# Patient Record
Sex: MALE | Race: WHITE | URBAN - METROPOLITAN AREA
[De-identification: names, ages, dates, MRNs, and addresses within clinical notes are randomized per-mention and may not be internally consistent; named-entity substitution may affect disease eponyms.]

---

## 2020-07-08 ENCOUNTER — OFFICE VISIT (OUTPATIENT)
Dept: URBAN - METROPOLITAN AREA TELEHEALTH 2 | Facility: TELEHEALTH | Age: 20
End: 2020-07-08
Payer: COMMERCIAL

## 2020-07-08 DIAGNOSIS — R10.84 ABDOMINAL PAIN, GENERALIZED: ICD-10-CM

## 2020-07-08 DIAGNOSIS — K50.80 CROHN'S DISEASE OF BOTH SMALL AND LARGE INTESTINE WITHOUT COMPLICATIONS: ICD-10-CM

## 2020-07-08 DIAGNOSIS — R74.8 ELEVATED LIVER ENZYMES: ICD-10-CM

## 2020-07-08 PROCEDURE — 99215 OFFICE O/P EST HI 40 MIN: CPT | Performed by: INTERNAL MEDICINE

## 2020-07-08 PROCEDURE — 82607 VITAMIN B-12: CPT | Performed by: INTERNAL MEDICINE

## 2020-07-08 PROCEDURE — 80299 QUANTITATIVE ASSAY DRUG: CPT | Performed by: INTERNAL MEDICINE

## 2020-07-08 PROCEDURE — 82397 CHEMILUMINESCENT ASSAY: CPT | Performed by: INTERNAL MEDICINE

## 2020-07-08 PROCEDURE — 82746 ASSAY OF FOLIC ACID SERUM: CPT | Performed by: INTERNAL MEDICINE

## 2020-07-08 RX ORDER — USTEKINUMAB 90 MG/ML
INJECT 1 MILLILITER (90 MG) BY SUBCUTANEOUS ROUTE EVERY 8 WEEKS FOR 56 DAYS INJECTION, SOLUTION SUBCUTANEOUS
Qty: 1 | Refills: 6 | Status: ACTIVE | COMMUNITY
Start: 2020-03-23 | End: 2021-04-19

## 2020-07-08 NOTE — HPI-OTHER HISTORIES
21 yo male with Crohn's disease dx this year.  Feeling the best he has in the last 6 months.  CD was ongoing in 2019 and was not diagnosed.  Primarily  the abd pain.  Had some diarrhea, only food triggered.  Had diarrhea up to 5x a day. No bleeding. Abd pain was moderate.  Abd pain is gone. Diarrhea is resolved. Stooling nl.  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  Meds: Stelara 90 mg q 8 weeks Vit D 10,000 u  Fluoxetine 20 mg a day ---- it has helped the anxiety.  He has more energy.  Follow up in 2 months.  everything is good.   Had a borderline b12, elevated liver test, and high calpro and lact values. will want to f/u on these and have him do a f2f in 3 months and a colonoscopy in early 20  This is a scheduled appointment for this patient, a 20 year old male, after a previous visit approximately Crohn's disease.     21 yo male seen 3 weeks ago presents for telehealth audio visit as video not connected.  Syx are the same.  Abdominal pain is the same as a few weeks ago. Pain is 1 hour or less. Pain is 10 minutes or less.  No constipation or diarrhea.  No bleeding.  Tapering prednisone and is down to 10 mg or 1 pill a day.  On flagyl 500 bid.  He does not know why he does not feel well.  He does not feel the Crohn's is a problem right now.  Physically with the Crohn's is ok.  Mentally, not good. Been on 1 pill a day and had an 8 week course. Got the Stelara 5 days ago.  Stop the prednisone. Taking Vit D. B12 of 299. labs generally good ----- lft 111. No alcohol  His mind is racing. He can't get his mind to settle. This was an issue from before.    ===== 3/23/20 21 yo male referred by Emil Bran MD for Crohn's disease care and for evaluation and treatment.  Syx started summer 2019 and they have been intermittent.  He thought syx were muscular.  Had pain for 2 weeks but severe for 2 days before presenting to the ER on 19.  Dr. Abreu-surgeon - saw him in the ER, got a CT scan, and did a lap appendectomy.  Went home the same.    Was on cipro for 10 days and might have been better.  Pain was better over the next few weeks.  Went back to the ER because of abdominal pain and was reassured.  One week later, went back to the ER due to abdominal pain,did a CT scan, and was told there was inflammation and scar tissue.  Pain was moderate, mostly with activity.  Over the next 2 months, syx were intermittent, saw family medicine, saw the surgeon.  , Dr. Agustin, [facility] surgery, ordered another CT due to pain, and was admitted  and had stump appendectomy and graunulomas were seen on that bx.  After second surgery was on cipro and flagyl and just finished them both and tolerated them fine and has been feeling better than in past.  10 days of both after second surgery.  Colonoscopy on  and this showed ileocecal ulceration and bx c/w Crohn's disease. Granulomas not seen.  Granulation tissue and crypt abscesses.  Was on 40 mg prednisone a day and this is second week of 30 mg a day.  8 week course/taper.  Currently on prednisone 30 mg a day.  Has some low grade intermittent abdominal pain.  Ate junk food.  Syx  2 stools a day No pain for days. Energy       Review of Systems  ConstitutionalDenies : body aches, fever, weight gain, weight loss  CardiovascularDenies : chest pain, heart racing/skipping, high blood pressure  RespiratoryDenies : chronic cough, shortness of breath, wheezing or asthma symptoms  GastrointestinalDenies : Abdominal Pain/discomfort, Anal/Rectal Pain or Itching, Anal Spasm, Black Stool, Bloating/belching/gaseouness, Change of Bowel Habit, Constipation, Diarrhea/Loose Stool, Difficulty in Swallowing, Heartburn/esophageal reflux, Hemorrhoids, Indigestion, Mucus in Stool, Nausea/vomiting, Rectal Bleeding, Unintentional Weight Loss    Vitals  Date Time BP Position Site L\R Cuff Size HR RR TEMP (F) WT  HT  BMI kg/m2 BSA m2 O2 Sat HC     2020 09:51 AM         161lbs 0oz 6'   21.84 1.93          Assessment  Crohn's Disease     555.2  Elevated LFTs     790.6/R94.5   Problems Reconciled  Plan  OrdersPatient not identified as an unhealthy alcohol user when screene () - - 2020  Influenza immunization administered or previously received () - - 2020  BMI screening above normal () - - 2020  Current tobacco non-user (CAD, cap, COPD, PV) (dm) (IBD) (1036F, ) - - 2020  Colorectal cancer screening results documented and reviewed (PV) (3017F) - - 2020  Documentation of current medications. () - - 2020  MedicationsMedications have been Reconciled  Transition of Care or Provider Policy  InstructionsPatient seen today via telehealth by agreement and consent of patient in light of current COVID-19 pandemic. I used video conferencing during the visit. The patient encounter is appropriate and reasonable under the circumstances given the patient's particular presentation at this time. The patient has been advised of the followin) the potential risks and limitations of this mode of treatment (including but not limited to the absence of in-person examination); 2) the right to refuse telehealth services at any point without affecting the right to future care; 3) the right to receive in-person services, included immediately after this consultation if an urgent need arises; 4) information, including identifiable images or information from this telehealth consult, will only be shared in accordance with HIPPA regulations. Any and all of the patient's and/or patient's family member's questions on this issue have been answered. The patient has verbally consented to be treated via telehealth services. The patient has also been advised to contact this office for worsening conditions or problems, and seek emergency medical treatment and/or call 911 if the patient deems either necessary.  Telephone E/M 16-20 minutes ([id number] NP/EP)  I have documented a list of current medications and reviewed it with the patient.  I encouraged the patient to diet and exercise.  DispositionReturn To Clinic in 1 Month    75340 visit. 16 minutes.  telehealth in a month  d/c flagyl d/c prednisone         Electronically Signed by: Norris Panchal MD -Author on 2020 02:53:19 PM

## 2020-08-14 LAB
A/G RATIO: 2.2
ALBUMIN: 4.7
ALKALINE PHOSPHATASE: 104
ALT (SGPT): 15
ANTI-USTEKINUMAB ANTIBODY: <40
AST (SGOT): 14
BASO (ABSOLUTE): 0
BASOS: 0
BILIRUBIN, TOTAL: 0.4
BUN/CREATININE RATIO: 10
BUN: 11
C-REACTIVE PROTEIN, QUANT: 1
CALCIUM: 9.8
CALPROTECTIN, FECAL: 201
CARBON DIOXIDE, TOTAL: 23
CHLORIDE: 102
CREATININE: 1.09
EGFR IF AFRICN AM: 112
EGFR IF NONAFRICN AM: 97
EOS (ABSOLUTE): 0
EOS: 0
FERRITIN, SERUM: 20
FOLATE (FOLIC ACID), SERUM: 11.9
GLOBULIN, TOTAL: 2.1
GLUCOSE: 93
HEMATOCRIT: 39.7
HEMATOLOGY COMMENTS:: (no result)
HEMOGLOBIN: 13.5
IMMATURE CELLS: (no result)
IMMATURE GRANS (ABS): 0
IMMATURE GRANULOCYTES: 0
IRON BIND.CAP.(TIBC): 385
IRON SATURATION: 29
IRON: 110
LACTOFERRIN, FECAL, QUANT.: 6.21
LYMPHS (ABSOLUTE): 1.8
LYMPHS: 25
MCH: 28.7
MCHC: 34
MCV: 85
MONOCYTES(ABSOLUTE): 0.6
MONOCYTES: 8
NEUTROPHILS (ABSOLUTE): 4.8
NEUTROPHILS: 67
NRBC: (no result)
PLATELETS: 314
POTASSIUM: 4.4
PROTEIN, TOTAL: 6.8
RBC: 4.7
RDW: 11.7
SEDIMENTATION RATE-WESTERGREN: 2
SODIUM: 141
UIBC: 275
USTEKINUMAB: 3.8
VITAMIN B12: 269
WBC: 7.2

## 2021-03-04 ENCOUNTER — TELEPHONE ENCOUNTER (OUTPATIENT)
Dept: URBAN - METROPOLITAN AREA CLINIC 98 | Facility: CLINIC | Age: 21
End: 2021-03-04

## 2021-03-04 RX ORDER — USTEKINUMAB 90 MG/ML
INJECT 90 MG INJECTION, SOLUTION SUBCUTANEOUS
Qty: 1 SYRINGE | Refills: 6 | OUTPATIENT
Start: 2021-03-04 | End: 2022-04-28

## 2021-03-06 ENCOUNTER — ERX REFILL RESPONSE (OUTPATIENT)
Dept: URBAN - METROPOLITAN AREA CLINIC 98 | Facility: CLINIC | Age: 21
End: 2021-03-06

## 2021-03-06 RX ORDER — USTEKINUMAB 90 MG/ML
INJECT 1ML (90MG) UNDER THE SKIN EVERY 8 WEEKS INJECTION, SOLUTION SUBCUTANEOUS
Qty: 1 | Refills: 5

## 2021-04-27 ENCOUNTER — TELEPHONE ENCOUNTER (OUTPATIENT)
Dept: URBAN - METROPOLITAN AREA CLINIC 98 | Facility: CLINIC | Age: 21
End: 2021-04-27

## 2021-04-27 RX ORDER — USTEKINUMAB 90 MG/ML
INJECT 90 MG INJECTION, SOLUTION SUBCUTANEOUS
Qty: 1 SYRINGE | Refills: 6 | OUTPATIENT
Start: 2021-04-27 | End: 2022-06-21

## 2021-05-04 ENCOUNTER — TELEPHONE ENCOUNTER (OUTPATIENT)
Dept: URBAN - METROPOLITAN AREA CLINIC 98 | Facility: CLINIC | Age: 21
End: 2021-05-04

## 2021-05-04 RX ORDER — USTEKINUMAB 90 MG/ML
INJECT 90 MG INJECTION, SOLUTION SUBCUTANEOUS
Qty: 1 SYRINGE | Refills: 6
Start: 2021-04-27

## 2021-05-04 RX ORDER — USTEKINUMAB 90 MG/ML
INJECT 90 MG INJECTION, SOLUTION SUBCUTANEOUS
Qty: 1 SYRINGE | Refills: 6 | OUTPATIENT
Start: 2021-05-04 | End: 2022-06-28

## 2021-05-07 ENCOUNTER — TELEPHONE ENCOUNTER (OUTPATIENT)
Dept: URBAN - METROPOLITAN AREA CLINIC 98 | Facility: CLINIC | Age: 21
End: 2021-05-07

## 2021-05-07 RX ORDER — USTEKINUMAB 90 MG/ML
INJECT 90 MG INJECTION, SOLUTION SUBCUTANEOUS
Qty: 1 SYRINGE | Refills: 6 | OUTPATIENT
Start: 2021-05-07 | End: 2022-07-01

## 2022-04-09 ENCOUNTER — TELEPHONE ENCOUNTER (OUTPATIENT)
Dept: URBAN - METROPOLITAN AREA CLINIC 92 | Facility: CLINIC | Age: 22
End: 2022-04-09

## 2022-04-23 ENCOUNTER — ERX REFILL RESPONSE (OUTPATIENT)
Dept: URBAN - METROPOLITAN AREA CLINIC 98 | Facility: CLINIC | Age: 22
End: 2022-04-23

## 2022-04-23 RX ORDER — USTEKINUMAB 90 MG/ML
INJECT 1ML (90MG) UNDER THE SKIN EVERY 8 WEEKS INJECTION, SOLUTION SUBCUTANEOUS
Qty: 1 | Refills: 5 | OUTPATIENT

## 2022-04-23 RX ORDER — USTEKINUMAB 90 MG/ML
INJECT 90 MG INJECTION, SOLUTION SUBCUTANEOUS
Qty: 1 SYRINGE | Refills: 6 | OUTPATIENT

## 2022-05-15 LAB
A/G RATIO: 1.9
ALBUMIN: 4.7
ALKALINE PHOSPHATASE: 104
ALT (SGPT): 18
AST (SGOT): 14
BASO (ABSOLUTE): 0.1
BASOS: 1
BILIRUBIN, TOTAL: 0.2
BUN/CREATININE RATIO: 7
BUN: 7
C-REACTIVE PROTEIN, QUANT: 107
CALCIUM: 9.8
CARBON DIOXIDE, TOTAL: 21
CHLORIDE: 101
CREATININE: 0.97
EGFR: 113
EOS (ABSOLUTE): 0.2
EOS: 2
FERRITIN, SERUM: 226
FOLATE (FOLIC ACID), SERUM: 5.7
GLOBULIN, TOTAL: 2.5
GLUCOSE: 87
HBSAG SCREEN: NEGATIVE
HEMATOCRIT: 42.3
HEMATOLOGY COMMENTS:: (no result)
HEMOGLOBIN: 14.2
HEP B SURFACE AB, QUAL: NON REACTIVE
IMMATURE CELLS: (no result)
IMMATURE GRANS (ABS): 0
IMMATURE GRANULOCYTES: 0
IRON BIND.CAP.(TIBC): 331
IRON SATURATION: 5
IRON: 16
LYMPHS (ABSOLUTE): 2
LYMPHS: 15
MCH: 30.5
MCHC: 33.6
MCV: 91
MONOCYTES(ABSOLUTE): 0.6
MONOCYTES: 4
NEUTROPHILS (ABSOLUTE): 10
NEUTROPHILS: 78
NRBC: (no result)
PLATELETS: 265
POTASSIUM: 3.8
PROTEIN, TOTAL: 7.2
QUANTIFERON CRITERIA: (no result)
QUANTIFERON INCUBATION: (no result)
QUANTIFERON MITOGEN VALUE: >10
QUANTIFERON NIL VALUE: 0.01
QUANTIFERON TB1 AG VALUE: 0.01
QUANTIFERON TB2 AG VALUE: 0.01
QUANTIFERON-TB GOLD PLUS: NEGATIVE
RBC: 4.66
RDW: 11.8
SEDIMENTATION RATE-WESTERGREN: 24
SODIUM: 141
UIBC: 315
VITAMIN B12: 307
VITAMIN D, 25-HYDROXY: 22.2
WBC: 12.8

## 2022-06-03 ENCOUNTER — OFFICE VISIT (OUTPATIENT)
Dept: URBAN - METROPOLITAN AREA TELEHEALTH 2 | Facility: TELEHEALTH | Age: 22
End: 2022-06-03
Payer: COMMERCIAL

## 2022-06-03 DIAGNOSIS — R94.5 ELEVATED LFTS: ICD-10-CM

## 2022-06-03 DIAGNOSIS — K50.80 CROHN'S COLITIS: ICD-10-CM

## 2022-06-03 DIAGNOSIS — R10.84 ABDOMINAL PAIN, GENERALIZED: ICD-10-CM

## 2022-06-03 PROCEDURE — 99213 OFFICE O/P EST LOW 20 MIN: CPT | Performed by: INTERNAL MEDICINE

## 2022-06-03 RX ORDER — USTEKINUMAB 90 MG/ML
INJECT 90 MG INJECTION, SOLUTION SUBCUTANEOUS
Qty: 1 SYRINGE | Refills: 6 | Status: ACTIVE | COMMUNITY

## 2022-06-03 NOTE — HPI-TODAY'S VISIT:
21 yo male for telehealth visit. 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  Crohn's disease has been stable. No recurrence of syx. I started him on Stelara. Hadnot been on other biologics.  Currently no pain diarrhea or bleeding.  He went and had labs a couple weeks ago and 22 labs showed  ESR 24/15 CRP-107!!!~ nl cbc fe5% Vit D -100   Day of labs was in [place] and had bad allergies and that might have causedd the CRP to be so high and so will repeat.   ========================== 2020  19 yo male with Crohn's disease dx this year.  Feeling the best he has in the last 6 months.  CD was ongoing in 2019 and was not diagnosed.  Primarily  the abd pain.  Had some diarrhea, only food triggered.  Had diarrhea up to 5x a day. No bleeding. Abd pain was moderate.  Abd pain is gone. Diarrhea is resolved. Stooling nl.  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  Meds: Stelara 90 mg q 8 weeks Vit D 10,000 u  Fluoxetine 20 mg a day ---- it has helped the anxiety.  He has more energy.  Follow up in 2 months.  everything is good.   Had a borderline b12, elevated liver test, and high calpro and lact values. will want to f/u on these and have him do a f2f in 3 months and a colonoscopy in early 20  This is a scheduled appointment for this patient, a 20 year old male, after a previous visit approximately Crohn's disease.     19 yo male seen 3 weeks ago presents for telehealth audio visit as video not connected.  Syx are the same.  Abdominal pain is the same as a few weeks ago. Pain is 1 hour or less. Pain is 10 minutes or less.  No constipation or diarrhea.  No bleeding.  Tapering prednisone and is down to 10 mg or 1 pill a day.  On flagyl 500 bid.  He does not know why he does not feel well.  He does not feel the Crohn's is a problem right now.  Physically with the Crohn's is ok.  Mentally, not good. Been on 1 pill a day and had an 8 week course. Got the Stelara 5 days ago.  Stop the prednisone. Taking Vit D. B12 of 299. labs generally good ----- lft 111. No alcohol  His mind is racing. He can't get his mind to settle. This was an issue from before.    ===== 3/23/20 19 yo male referred by Emil Bran MD for Crohn's disease care and for evaluation and treatment.  Syx started summer 2019 and they have been intermittent.  He thought syx were muscular.  Had pain for 2 weeks but severe for 2 days before presenting to the ER on 19.  Dr. Abreu-surgeon - saw him in the ER, got a CT scan, and did a lap appendectomy.  Went home the same.    Was on cipro for 10 days and might have been better.  Pain was better over the next few weeks.  Went back to the ER because of abdominal pain and was reassured.  One week later, went back to the ER due to abdominal pain,did a CT scan, and was told there was inflammation and scar tissue.  Pain was moderate, mostly with activity.  Over the next 2 months, syx were intermittent, saw family medicine, saw the surgeon.  , Dr. Agustin, [facility] surgery, ordered another CT due to pain, and was admitted  and had stump appendectomy and graunulomas were seen on that bx.  After second surgery was on cipro and flagyl and just finished them both and tolerated them fine and has been feeling better than in past.  10 days of both after second surgery.  Colonoscopy on  and this showed ileocecal ulceration and bx c/w Crohn's disease. Granulomas not seen.  Granulation tissue and crypt abscesses.  Was on 40 mg prednisone a day and this is second week of 30 mg a day.  8 week course/taper.  Currently on prednisone 30 mg a day.  Has some low grade intermittent abdominal pain.  Ate junk food.  Syx  2 stools a day No pain for days. Energy       Review of Systems  ConstitutionalDenies : body aches, fever, weight gain, weight loss  CardiovascularDenies : chest pain, heart racing/skipping, high blood pressure  RespiratoryDenies : chronic cough, shortness of breath, wheezing or asthma symptoms  GastrointestinalDenies : Abdominal Pain/discomfort, Anal/Rectal Pain or Itching, Anal Spasm, Black Stool, Bloating/belching/gaseouness, Change of Bowel Habit, Constipation, Diarrhea/Loose Stool, Difficulty in Swallowing, Heartburn/esophageal reflux, Hemorrhoids, Indigestion, Mucus in Stool, Nausea/vomiting, Rectal Bleeding, Unintentional Weight Loss    Vitals  Date Time BP Position Site L\R Cuff Size HR RR TEMP (F) WT  HT  BMI kg/m2 BSA m2 O2 Sat HC     2020 09:51 AM         161lbs 0oz 6'   21.84 1.93          Assessment  Crohn's Disease     555.2  Elevated LFTs     790.6/R94.5   Problems Reconciled  Plan  OrdersPatient not identified as an unhealthy alcohol user when screene () - - 2020  Influenza immunization administered or previously received () - - 2020  BMI screening above normal () - - 2020  Current tobacco non-user (CAD, cap, COPD, PV) (dm) (IBD) (1036F, ) - - 2020  Colorectal cancer screening results documented and reviewed (PV) (3017F) - - 2020  Documentation of current medications. () - - 2020  MedicationsMedications have been Reconciled  Transition of Care or Provider Policy  InstructionsPatient seen today via telehealth by agreement and consent of patient in light of current COVID-19 pandemic. I used video conferencing during the visit. The patient encounter is appropriate and reasonable under the circumstances given the patient's particular presentation at this time. The patient has been advised of the followin) the potential risks and limitations of this mode of treatment (including but not limited to the absence of in-person examination); 2) the right to refuse telehealth services at any point without affecting the right to future care; 3) the right to receive in-person services, included immediately after this consultation if an urgent need arises; 4) information, including identifiable images or information from this telehealth consult, will only be shared in accordance with HIPPA regulations. Any and all of the patient's and/or patient's family member's questions on this issue have been answered. The patient has verbally consented to be treated via telehealth services. The patient has also been advised to contact this office for worsening conditions or problems, and seek emergency medical treatment and/or call 911 if the patient deems either necessary.  Telephone E/M 16-20 minutes ([id number] NP/EP)  I have documented a list of current medications and reviewed it with the patient.  I encouraged the patient to diet and exercise.  DispositionReturn To Clinic in 1 Month    47609 visit. 16 minutes.  telehealth in a month  d/c flagyl d/c prednisone         Electronically Signed by: Norris Panchal MD -Author on 2020 02:53:19 PM

## 2022-06-14 ENCOUNTER — OFFICE VISIT (OUTPATIENT)
Dept: URBAN - METROPOLITAN AREA CLINIC 98 | Facility: CLINIC | Age: 22
End: 2022-06-14

## 2022-07-05 ENCOUNTER — OFFICE VISIT (OUTPATIENT)
Dept: URBAN - METROPOLITAN AREA CLINIC 98 | Facility: CLINIC | Age: 22
End: 2022-07-05
Payer: COMMERCIAL

## 2022-07-05 ENCOUNTER — TELEPHONE ENCOUNTER (OUTPATIENT)
Dept: URBAN - METROPOLITAN AREA CLINIC 98 | Facility: CLINIC | Age: 22
End: 2022-07-05

## 2022-07-05 ENCOUNTER — WEB ENCOUNTER (OUTPATIENT)
Dept: URBAN - METROPOLITAN AREA CLINIC 98 | Facility: CLINIC | Age: 22
End: 2022-07-05

## 2022-07-05 VITALS
HEART RATE: 82 BPM | DIASTOLIC BLOOD PRESSURE: 73 MMHG | HEIGHT: 72 IN | WEIGHT: 170 LBS | BODY MASS INDEX: 23.03 KG/M2 | SYSTOLIC BLOOD PRESSURE: 107 MMHG | TEMPERATURE: 97.9 F

## 2022-07-05 DIAGNOSIS — R10.84 ABDOMINAL PAIN, GENERALIZED: ICD-10-CM

## 2022-07-05 DIAGNOSIS — R94.5 ELEVATED LFTS: ICD-10-CM

## 2022-07-05 DIAGNOSIS — K50.90 CROHN'S DISEASE WITHOUT COMPLICATION, UNSPECIFIED GASTROINTESTINAL TRACT LOCATION: ICD-10-CM

## 2022-07-05 PROCEDURE — 99215 OFFICE O/P EST HI 40 MIN: CPT | Performed by: INTERNAL MEDICINE

## 2022-07-05 RX ORDER — USTEKINUMAB 90 MG/ML
INJECT 90 MG INJECTION, SOLUTION SUBCUTANEOUS
Qty: 1 SYRINGE | Refills: 6 | Status: ACTIVE | COMMUNITY

## 2022-07-05 RX ORDER — USTEKINUMAB 90 MG/ML
INJECT 90 MG INJECTION, SOLUTION SUBCUTANEOUS
Qty: 1 SYRINGE | Refills: 6
End: 2023-08-29

## 2022-07-05 NOTE — HPI-TODAY'S VISIT:
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 Back home for check up.  Feeling fine. Only time he has a problem is just before the Stelara Proactive monitoring rather than reactive monitroing. Awaiting Stelara and other labs.  Dx with CD 2020, dr. Bran. Rx only with Stelara started 2020. Before Stelara, had appy x2. 22------ Let him know i reviewed his stool tests which were nl, and so, if he had been taking stelara q 10, just move up to q 8 and that will be fine ============================= 6/3/22 and prior TH on 20  21 yo male for telehealth visit. 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  Crohn's disease has been stable. No recurrence of syx. I started him on Stelara. Hadnot been on other biologics.  Currently no pain diarrhea or bleeding.  He went and had labs a couple weeks ago and 22 labs showed  ESR 24/15 CRP-107!!!~ nl cbc fe5% Vit D -100   Day of labs was in [place] and had bad allergies and that might have causedd the CRP to be so high and so will repeat.   ========================== 2020  21 yo male with Crohn's disease dx this year.  Feeling the best he has in the last 6 months.  CD was ongoing in 2019 and was not diagnosed.  Primarily  the abd pain.  Had some diarrhea, only food triggered.  Had diarrhea up to 5x a day. No bleeding. Abd pain was moderate.  Abd pain is gone. Diarrhea is resolved. Stooling nl.  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  Meds: Stelara 90 mg q 8 weeks Vit D 10,000 u  Fluoxetine 20 mg a day ---- it has helped the anxiety.  He has more energy.  Follow up in 2 months.  everything is good.   Had a borderline b12, elevated liver test, and high calpro and lact values. will want to f/u on these and have him do a f2f in 3 months and a colonoscopy in early 20  This is a scheduled appointment for this patient, a 20 year old male, after a previous visit approximately Crohn's disease.     21 yo male seen 3 weeks ago presents for telehealth audio visit as video not connected.  Syx are the same.  Abdominal pain is the same as a few weeks ago. Pain is 1 hour or less. Pain is 10 minutes or less.  No constipation or diarrhea.  No bleeding.  Tapering prednisone and is down to 10 mg or 1 pill a day.  On flagyl 500 bid.  He does not know why he does not feel well.  He does not feel the Crohn's is a problem right now.  Physically with the Crohn's is ok.  Mentally, not good. Been on 1 pill a day and had an 8 week course. Got the Stelara 5 days ago.  Stop the prednisone. Taking Vit D. B12 of 299. labs generally good ----- lft 111. No alcohol  His mind is racing. He can't get his mind to settle. This was an issue from before.    ===== 3/23/20 21 yo male referred by Emil Bran MD for Crohn's disease care and for evaluation and treatment.  Syx started summer 2019 and they have been intermittent.  He thought syx were muscular.  Had pain for 2 weeks but severe for 2 days before presenting to the ER on 19.  Dr. Abreu-surgeon - saw him in the ER, got a CT scan, and did a lap appendectomy.  Went home the same.    Was on cipro for 10 days and might have been better.  Pain was better over the next few weeks.  Went back to the ER because of abdominal pain and was reassured.  One week later, went back to the ER due to abdominal pain,did a CT scan, and was told there was inflammation and scar tissue.  Pain was moderate, mostly with activity.  Over the next 2 months, syx were intermittent, saw family medicine, saw the surgeon.  , Dr. Agustin, [facility] surgery, ordered another CT due to pain, and was admitted  and had stump appendectomy and graunulomas were seen on that bx.  After second surgery was on cipro and flagyl and just finished them both and tolerated them fine and has been feeling better than in past.  10 days of both after second surgery.  Colonoscopy on  and this showed ileocecal ulceration and bx c/w Crohn's disease. Granulomas not seen.  Granulation tissue and crypt abscesses.  Was on 40 mg prednisone a day and this is second week of 30 mg a day.  8 week course/taper.  Currently on prednisone 30 mg a day.  Has some low grade intermittent abdominal pain.  Ate junk food.  Syx  2 stools a day No pain for days. Energy       Review of Systems  ConstitutionalDenies : body aches, fever, weight gain, weight loss  CardiovascularDenies : chest pain, heart racing/skipping, high blood pressure  RespiratoryDenies : chronic cough, shortness of breath, wheezing or asthma symptoms  GastrointestinalDenies : Abdominal Pain/discomfort, Anal/Rectal Pain or Itching, Anal Spasm, Black Stool, Bloating/belching/gaseouness, Change of Bowel Habit, Constipation, Diarrhea/Loose Stool, Difficulty in Swallowing, Heartburn/esophageal reflux, Hemorrhoids, Indigestion, Mucus in Stool, Nausea/vomiting, Rectal Bleeding, Unintentional Weight Loss    Vitals  Date Time BP Position Site L\R Cuff Size HR RR TEMP (F) WT  HT  BMI kg/m2 BSA m2 O2 Sat HC     2020 09:51 AM         161lbs 0oz 6'   21.84 1.93          Assessment  Crohn's Disease     555.2  Elevated LFTs     790.6/R94.5   Problems Reconciled  Plan  OrdersPatient not identified as an unhealthy alcohol user when screene () - - 2020  Influenza immunization administered or previously received () - - 2020  BMI screening above normal () - - 2020  Current tobacco non-user (CAD, cap, COPD, PV) (dm) (IBD) (1036F, ) - - 2020  Colorectal cancer screening results documented and reviewed (PV) (3017F) - - 2020  Documentation of current medications. () - - 2020  MedicationsMedications have been Reconciled  Transition of Care or Provider Policy  InstructionsPatient seen today via telehealth by agreement and consent of patient in light of current COVID-19 pandemic. I used video conferencing during the visit. The patient encounter is appropriate and reasonable under the circumstances given the patient's particular presentation at this time. The patient has been advised of the followin) the potential risks and limitations of this mode of treatment (including but not limited to the absence of in-person examination); 2) the right to refuse telehealth services at any point without affecting the right to future care; 3) the right to receive in-person services, included immediately after this consultation if an urgent need arises; 4) information, including identifiable images or information from this telehealth consult, will only be shared in accordance with HIPPA regulations. Any and all of the patient's and/or patient's family member's questions on this issue have been answered. The patient has verbally consented to be treated via telehealth services. The patient has also been advised to contact this office for worsening conditions or problems, and seek emergency medical treatment and/or call 911 if the patient deems either necessary.  Telephone E/M 16-20 minutes ([id number] NP/EP)  I have documented a list of current medications and reviewed it with the patient.  I encouraged the patient to diet and exercise.  DispositionReturn To Clinic in 1 Month    30634 visit. 16 minutes.  telehealth in a month  d/c flagyl d/c prednisone       ----------22  et him know i reviewed his stool tests which were nl, and so, if he had been taking stelara q 10, just move up to q 8 and that will be fine  --------------------------- Electronically Signed by: Norris Panchal MD -Author on 2020 02:53:19 PM

## 2022-07-11 LAB
A/G RATIO: 1.9
ALBUMIN: 4.7
ALKALINE PHOSPHATASE: 79
ALT (SGPT): 22
ANTI-USTEKINUMAB ANTIBODY: <40
AST (SGOT): 16
BASO (ABSOLUTE): 0
BASOS: 0
BILIRUBIN, TOTAL: 0.3
BUN/CREATININE RATIO: 9
BUN: 10
C-REACTIVE PROTEIN, QUANT: <1
CALCIUM: 9.4
CARBON DIOXIDE, TOTAL: 22
CHLORIDE: 102
CREATININE: 1.1
EGFR: 97
EOS (ABSOLUTE): 0.1
EOS: 1
GLOBULIN, TOTAL: 2.5
GLUCOSE: 92
HEMATOCRIT: 42.6
HEMATOLOGY COMMENTS:: (no result)
HEMOGLOBIN: 14
IMMATURE CELLS: (no result)
IMMATURE GRANS (ABS): 0
IMMATURE GRANULOCYTES: 0
LYMPHS (ABSOLUTE): 3.3
LYMPHS: 35
MCH: 30.2
MCHC: 32.9
MCV: 92
MONOCYTES(ABSOLUTE): 0.5
MONOCYTES: 5
NEUTROPHILS (ABSOLUTE): 5.4
NEUTROPHILS: 59
NRBC: (no result)
PLATELETS: 294
POTASSIUM: 4.4
PROTEIN, TOTAL: 7.2
RBC: 4.63
RDW: 12.3
SEDIMENTATION RATE-WESTERGREN: 2
SODIUM: 140
USTEKINUMAB: 1.3
WBC: 9.4

## 2022-07-12 ENCOUNTER — TELEPHONE ENCOUNTER (OUTPATIENT)
Dept: URBAN - METROPOLITAN AREA CLINIC 98 | Facility: CLINIC | Age: 22
End: 2022-07-12

## 2022-08-03 LAB
CALPROTECTIN, FECAL: <16
LACTOFERRIN, FECAL, QUANT.: 1.55

## 2022-11-17 ENCOUNTER — CLAIMS CREATED FROM THE CLAIM WINDOW (OUTPATIENT)
Dept: URBAN - METROPOLITAN AREA SURGERY CENTER 18 | Facility: SURGERY CENTER | Age: 22
End: 2022-11-17

## 2022-11-17 ENCOUNTER — CLAIMS CREATED FROM THE CLAIM WINDOW (OUTPATIENT)
Dept: URBAN - METROPOLITAN AREA CLINIC 4 | Facility: CLINIC | Age: 22
End: 2022-11-17
Payer: COMMERCIAL

## 2022-11-17 ENCOUNTER — CLAIMS CREATED FROM THE CLAIM WINDOW (OUTPATIENT)
Dept: URBAN - METROPOLITAN AREA SURGERY CENTER 18 | Facility: SURGERY CENTER | Age: 22
End: 2022-11-17
Payer: COMMERCIAL

## 2022-11-17 DIAGNOSIS — K50.80 CROHN'S COLITIS: ICD-10-CM

## 2022-11-17 DIAGNOSIS — K22.2 ACQUIRED ESOPHAGEAL RING: ICD-10-CM

## 2022-11-17 DIAGNOSIS — B37.81 CANDIDA: ICD-10-CM

## 2022-11-17 DIAGNOSIS — K31.89 OTHER DISEASES OF STOMACH AND DUODENUM: ICD-10-CM

## 2022-11-17 DIAGNOSIS — K21.9 ACID REFLUX: ICD-10-CM

## 2022-11-17 PROCEDURE — 88305 TISSUE EXAM BY PATHOLOGIST: CPT | Performed by: PATHOLOGY

## 2022-11-17 PROCEDURE — 45380 COLONOSCOPY AND BIOPSY: CPT | Performed by: INTERNAL MEDICINE

## 2022-11-17 PROCEDURE — 43239 EGD BIOPSY SINGLE/MULTIPLE: CPT | Performed by: INTERNAL MEDICINE

## 2022-11-17 PROCEDURE — 88312 SPECIAL STAINS GROUP 1: CPT | Performed by: PATHOLOGY

## 2022-11-17 PROCEDURE — G8907 PT DOC NO EVENTS ON DISCHARG: HCPCS | Performed by: INTERNAL MEDICINE

## 2022-11-17 RX ORDER — USTEKINUMAB 90 MG/ML
INJECT 90 MG INJECTION, SOLUTION SUBCUTANEOUS
Qty: 1 SYRINGE | Refills: 6 | Status: ACTIVE | COMMUNITY
End: 2023-08-29

## 2022-12-13 ENCOUNTER — WEB ENCOUNTER (OUTPATIENT)
Dept: URBAN - METROPOLITAN AREA CLINIC 98 | Facility: CLINIC | Age: 22
End: 2022-12-13

## 2022-12-16 ENCOUNTER — CLAIMS CREATED FROM THE CLAIM WINDOW (OUTPATIENT)
Dept: URBAN - METROPOLITAN AREA TELEHEALTH 2 | Facility: TELEHEALTH | Age: 22
End: 2022-12-16
Payer: COMMERCIAL

## 2022-12-16 VITALS — HEIGHT: 72 IN | WEIGHT: 170 LBS | BODY MASS INDEX: 23.03 KG/M2

## 2022-12-16 DIAGNOSIS — K50.80 CROHN'S DISEASE: ICD-10-CM

## 2022-12-16 DIAGNOSIS — K50.90 CROHN'S DISEASE WITHOUT COMPLICATION, UNSPECIFIED GASTROINTESTINAL TRACT LOCATION: ICD-10-CM

## 2022-12-16 DIAGNOSIS — R94.5 ELEVATED LFTS: ICD-10-CM

## 2022-12-16 DIAGNOSIS — R10.84 ABDOMINAL PAIN, GENERALIZED: ICD-10-CM

## 2022-12-16 PROCEDURE — 99213 OFFICE O/P EST LOW 20 MIN: CPT | Performed by: INTERNAL MEDICINE

## 2022-12-16 RX ORDER — USTEKINUMAB 130 MG/26ML
AS DIRECTED SOLUTION INTRAVENOUS ONCE
Qty: 390 MILLIGRAMS | Refills: 0 | OUTPATIENT
Start: 2022-12-16 | End: 2022-12-17

## 2022-12-16 RX ORDER — USTEKINUMAB 90 MG/ML
INJECT 90 MG INJECTION, SOLUTION SUBCUTANEOUS
Qty: 1 SYRINGE | Refills: 6 | Status: ACTIVE | COMMUNITY
End: 2023-08-29

## 2022-12-16 NOTE — HPI-TODAY'S VISIT:
21 yo male with CD for TH f/u.  Dad has covid, and so they could not come from [place].  Perry has not had bivalent booster.  Wanted to review path from  procedure but not back yet.  Doing the same as 1 month ago.  Did not have any problemes.  Taking Stelara every 8 weeks.  No heartburn or esophageal complaints.  His trough uste level was 1.3, 6 months ago. He is going back to [place] where he will be staying. Needs iv re-induction due to low trough level., even though he has no pain diarrhea or bleeding,    ==================== 22  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 Back home for check up.  Feeling fine. Only time he has a problem is just before the Stelara Proactive monitoring rather than reactive monitroing. Awaiting Stelara and other labs.  Dx with CD 2020, dr. Bran. Rx only with Stelara started 2020. Before Stelara, had appy x2. 22------ Let him know i reviewed his stool tests which were nl, and so, if he had been taking stelara q 10, just move up to q 8 and that will be fine ============================= 6/3/22 and prior TH on 20  21 yo male for telehealth visit. Was in [place] and now in [place] and doing music.  Crohn's disease has been stable. No recurrence of syx. I started him on Stelara. Hadnot been on other biologics.  Currently no pain diarrhea or bleeding.  He went and had labs a couple weeks ago and 22 labs showed  ESR 24/15 CRP-107!!!~ nl cbc fe5% Vit D -100   Day of labs was in [place] and had bad allergies and that might have causedd the CRP to be so high and so will repeat.   ========================== 2020  21 yo male with Crohn's disease dx this year.  Feeling the best he has in the last 6 months.  CD was ongoing in 2019 and was not diagnosed.  Primarily  the abd pain.  Had some diarrhea, only food triggered.  Had diarrhea up to 5x a day. No bleeding. Abd pain was moderate.  Abd pain is gone. Diarrhea is resolved. Stooling nl.  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  Meds: Stelara 90 mg q 8 weeks Vit D 10,000 u  Fluoxetine 20 mg a day ---- it has helped the anxiety.  He has more energy.  Follow up in 2 months.  everything is good.   Had a borderline b12, elevated liver test, and high calpro and lact values. will want to f/u on these and have him do a f2f in 3 months and a colonoscopy in early 20  This is a scheduled appointment for this patient, a 20 year old male, after a previous visit approximately Crohn's disease.     21 yo male seen 3 weeks ago presents for telehealth audio visit as video not connected.  Syx are the same.  Abdominal pain is the same as a few weeks ago. Pain is 1 hour or less. Pain is 10 minutes or less.  No constipation or diarrhea.  No bleeding.  Tapering prednisone and is down to 10 mg or 1 pill a day.  On flagyl 500 bid.  He does not know why he does not feel well.  He does not feel the Crohn's is a problem right now.  Physically with the Crohn's is ok.  Mentally, not good. Been on 1 pill a day and had an 8 week course. Got the Stelara 5 days ago.  Stop the prednisone. Taking Vit D. B12 of 299. labs generally good ----- lft 111. No alcohol  His mind is racing. He can't get his mind to settle. This was an issue from before.    ===== 3/23/20 21 yo male referred by Emil Bran MD for Crohn's disease care and for evaluation and treatment.  Syx started summer 2019 and they have been intermittent.  He thought syx were muscular.  Had pain for 2 weeks but severe for 2 days before presenting to the ER on 19.  Dr. Abreu-surgeon - saw him in the ER, got a CT scan, and did a lap appendectomy.  Went home the same.    Was on cipro for 10 days and might have been better.  Pain was better over the next few weeks.  Went back to the ER because of abdominal pain and was reassured.  One week later, went back to the ER due to abdominal pain,did a CT scan, and was told there was inflammation and scar tissue.  Pain was moderate, mostly with activity.  Over the next 2 months, syx were intermittent, saw family medicine, saw the surgeon.  , Dr. Agustin, [facility] surgery, ordered another CT due to pain, and was admitted  and had stump appendectomy and graunulomas were seen on that bx.  After second surgery was on cipro and flagyl and just finished them both and tolerated them fine and has been feeling better than in past.  10 days of both after second surgery.  Colonoscopy on  and this showed ileocecal ulceration and bx c/w Crohn's disease. Granulomas not seen.  Granulation tissue and crypt abscesses.  Was on 40 mg prednisone a day and this is second week of 30 mg a day.  8 week course/taper.  Currently on prednisone 30 mg a day.  Has some low grade intermittent abdominal pain.  Ate junk food.  Syx  2 stools a day No pain for days. Energy       Review of Systems  ConstitutionalDenies : body aches, fever, weight gain, weight loss  CardiovascularDenies : chest pain, heart racing/skipping, high blood pressure  RespiratoryDenies : chronic cough, shortness of breath, wheezing or asthma symptoms  GastrointestinalDenies : Abdominal Pain/discomfort, Anal/Rectal Pain or Itching, Anal Spasm, Black Stool, Bloating/belching/gaseouness, Change of Bowel Habit, Constipation, Diarrhea/Loose Stool, Difficulty in Swallowing, Heartburn/esophageal reflux, Hemorrhoids, Indigestion, Mucus in Stool, Nausea/vomiting, Rectal Bleeding, Unintentional Weight Loss    Vitals  Date Time BP Position Site L\R Cuff Size HR RR TEMP (F) WT  HT  BMI kg/m2 BSA m2 O2 Sat HC     2020 09:51 AM         161lbs 0oz 6'   21.84 1.93          Assessment  Crohn's Disease     555.2  Elevated LFTs     790.6/R94.5   Problems Reconciled  Plan  OrdersPatient not identified as an unhealthy alcohol user when screene () - - 2020  Influenza immunization administered or previously received () - - 2020  BMI screening above normal () - - 2020  Current tobacco non-user (CAD, cap, COPD, PV) (dm) (IBD) (1036F, ) - - 2020  Colorectal cancer screening results documented and reviewed (PV) (3017F) - - 2020  Documentation of current medications. () - - 2020  MedicationsMedications have been Reconciled  Transition of Care or Provider Policy  InstructionsPatient seen today via telehealth by agreement and consent of patient in light of current COVID-19 pandemic. I used video conferencing during the visit. The patient encounter is appropriate and reasonable under the circumstances given the patient's particular presentation at this time. The patient has been advised of the followin) the potential risks and limitations of this mode of treatment (including but not limited to the absence of in-person examination); 2) the right to refuse telehealth services at any point without affecting the right to future care; 3) the right to receive in-person services, included immediately after this consultation if an urgent need arises; 4) information, including identifiable images or information from this telehealth consult, will only be shared in accordance with HIPPA regulations. Any and all of the patient's and/or patient's family member's questions on this issue have been answered. The patient has verbally consented to be treated via telehealth services. The patient has also been advised to contact this office for worsening conditions or problems, and seek emergency medical treatment and/or call 911 if the patient deems either necessary.  Telephone E/M 16-20 minutes ([id number] NP/EP)  I have documented a list of current medications and reviewed it with the patient.  I encouraged the patient to diet and exercise.  DispositionReturn To Clinic in 1 Month    25365 visit. 16 minutes.  telehealth in a month  d/c flagyl d/c prednisone       ----------22  et him know i reviewed his stool tests which were nl, and so, if he had been taking stelara q 10, just move up to q 8 and that will be fine  --------------------------- Electronically Signed by: Norris Panchal MD -Author on 2020 02:53:19 PM

## 2022-12-19 ENCOUNTER — CLAIMS CREATED FROM THE CLAIM WINDOW (OUTPATIENT)
Dept: URBAN - METROPOLITAN AREA TELEHEALTH 2 | Facility: TELEHEALTH | Age: 22
End: 2022-12-19
Payer: COMMERCIAL

## 2022-12-19 VITALS — BODY MASS INDEX: 23.03 KG/M2 | HEIGHT: 72 IN | WEIGHT: 170 LBS

## 2022-12-19 DIAGNOSIS — B37.81 ESOPHAGEAL CANDIDIASIS: ICD-10-CM

## 2022-12-19 DIAGNOSIS — K22.2 ESOPHAGEAL STENOSIS: ICD-10-CM

## 2022-12-19 DIAGNOSIS — R10.84 ABDOMINAL PAIN, GENERALIZED: ICD-10-CM

## 2022-12-19 DIAGNOSIS — K50.80 CROHN'S DISEASE: ICD-10-CM

## 2022-12-19 DIAGNOSIS — K50.80 CROHN'S COLITIS: ICD-10-CM

## 2022-12-19 DIAGNOSIS — R94.5 ELEVATED LFTS: ICD-10-CM

## 2022-12-19 DIAGNOSIS — K50.90 CROHN'S DISEASE WITHOUT COMPLICATION, UNSPECIFIED GASTROINTESTINAL TRACT LOCATION: ICD-10-CM

## 2022-12-19 PROBLEM — 300286002: Status: ACTIVE | Noted: 2022-12-19

## 2022-12-19 PROCEDURE — 99214 OFFICE O/P EST MOD 30 MIN: CPT | Performed by: INTERNAL MEDICINE

## 2022-12-19 RX ORDER — USTEKINUMAB 90 MG/ML
INJECT 90 MG INJECTION, SOLUTION SUBCUTANEOUS
Qty: 1 SYRINGE | Refills: 6 | Status: ACTIVE | COMMUNITY
End: 2023-08-29

## 2022-12-20 ENCOUNTER — TELEPHONE ENCOUNTER (OUTPATIENT)
Dept: URBAN - METROPOLITAN AREA CLINIC 98 | Facility: CLINIC | Age: 22
End: 2022-12-20

## 2022-12-20 PROBLEM — 34000006: Status: ACTIVE | Noted: 2022-04-09

## 2022-12-20 RX ORDER — FLUCONAZOLE 200 MG/1
1 TABLET TABLET ORAL ONCE DAILY
Qty: 10 TABLETS | Refills: 1 | OUTPATIENT
Start: 2022-12-20 | End: 2023-01-09

## 2023-01-03 ENCOUNTER — TELEPHONE ENCOUNTER (OUTPATIENT)
Dept: URBAN - METROPOLITAN AREA CLINIC 97 | Facility: CLINIC | Age: 23
End: 2023-01-03

## 2023-02-21 LAB
A/G RATIO: 2.2
ALBUMIN: 4.9
ALKALINE PHOSPHATASE: 87
ALT (SGPT): 15
ANTI-USTEKINUMAB ANTIBODY: <40
AST (SGOT): 14
BASO (ABSOLUTE): 0
BASOS: 0
BILIRUBIN, TOTAL: 0.3
BUN/CREATININE RATIO: 13
BUN: 13
C-REACTIVE PROTEIN, QUANT: <1
CALCIUM: 9.6
CARBON DIOXIDE, TOTAL: 25
CHLORIDE: 101
CREATININE: 1.02
EGFR: 107
EOS (ABSOLUTE): 0
EOS: 0
FERRITIN, SERUM: 87
FOLATE (FOLIC ACID), SERUM: 14.3
GLOBULIN, TOTAL: 2.2
GLUCOSE: 103
HBSAG SCREEN: NEGATIVE
HEMATOCRIT: 43.2
HEMATOLOGY COMMENTS:: (no result)
HEMOGLOBIN: 14.5
HEP B CORE AB, IGM: NEGATIVE
HEP B CORE AB, TOT: NEGATIVE
HEP B SURFACE AB, QUAL: NON REACTIVE
IMMATURE CELLS: (no result)
IMMATURE GRANS (ABS): 0
IMMATURE GRANULOCYTES: 0
IRON BIND.CAP.(TIBC): 334
IRON SATURATION: 19
IRON: 62
LYMPHS (ABSOLUTE): 1.7
LYMPHS: 27
Lab: (no result)
MCH: 30.7
MCHC: 33.6
MCV: 92
MONOCYTES(ABSOLUTE): 0.3
MONOCYTES: 4
NEUTROPHILS (ABSOLUTE): 4.3
NEUTROPHILS: 69
NRBC: (no result)
PLATELETS: 292
POTASSIUM: 4.3
PROTEIN, TOTAL: 7.1
RBC: 4.72
RDW: 12
SEDIMENTATION RATE-WESTERGREN: 2
SODIUM: 141
UIBC: 272
USTEKINUMAB: 2.7
VITAMIN B12: 336
WBC: 6.4

## 2023-04-04 ENCOUNTER — TELEPHONE ENCOUNTER (OUTPATIENT)
Dept: URBAN - METROPOLITAN AREA CLINIC 98 | Facility: CLINIC | Age: 23
End: 2023-04-04

## 2023-04-04 RX ORDER — USTEKINUMAB 90 MG/ML
INJECT 90 MG INJECTION, SOLUTION SUBCUTANEOUS
Qty: 1 SYRINGE | Refills: 6
End: 2024-05-28

## 2023-07-04 ENCOUNTER — ERX REFILL RESPONSE (OUTPATIENT)
Dept: URBAN - METROPOLITAN AREA CLINIC 98 | Facility: CLINIC | Age: 23
End: 2023-07-04

## 2023-07-04 RX ORDER — USTEKINUMAB 90 MG/ML
INJECT 90 MG UNDER THE SKIN EVERY 8 WEEKS INJECTION, SOLUTION SUBCUTANEOUS
Qty: 1 MILLILITER | Refills: 0 | OUTPATIENT

## 2023-07-04 RX ORDER — USTEKINUMAB 90 MG/ML
INJECT 90 MG INJECTION, SOLUTION SUBCUTANEOUS
Qty: 1 SYRINGE | Refills: 6 | OUTPATIENT

## 2023-08-24 ENCOUNTER — DASHBOARD ENCOUNTERS (OUTPATIENT)
Age: 23
End: 2023-08-24

## 2023-08-24 ENCOUNTER — TELEPHONE ENCOUNTER (OUTPATIENT)
Dept: URBAN - METROPOLITAN AREA CLINIC 98 | Facility: CLINIC | Age: 23
End: 2023-08-24

## 2023-08-24 RX ORDER — USTEKINUMAB 90 MG/ML
INJECT 90 MG INJECTION, SOLUTION SUBCUTANEOUS
Qty: 1 EACH | Refills: 9

## 2023-08-30 ENCOUNTER — CLAIMS CREATED FROM THE CLAIM WINDOW (OUTPATIENT)
Dept: URBAN - METROPOLITAN AREA TELEHEALTH 2 | Facility: TELEHEALTH | Age: 23
End: 2023-08-30
Payer: COMMERCIAL

## 2023-08-30 ENCOUNTER — OFFICE VISIT (OUTPATIENT)
Dept: URBAN - METROPOLITAN AREA TELEHEALTH 2 | Facility: TELEHEALTH | Age: 23
End: 2023-08-30

## 2023-08-30 ENCOUNTER — LAB OUTSIDE AN ENCOUNTER (OUTPATIENT)
Dept: URBAN - METROPOLITAN AREA TELEHEALTH 2 | Facility: TELEHEALTH | Age: 23
End: 2023-08-30

## 2023-08-30 ENCOUNTER — TELEPHONE ENCOUNTER (OUTPATIENT)
Dept: URBAN - METROPOLITAN AREA CLINIC 97 | Facility: CLINIC | Age: 23
End: 2023-08-30

## 2023-08-30 VITALS — HEIGHT: 72 IN | WEIGHT: 170 LBS | BODY MASS INDEX: 23.03 KG/M2

## 2023-08-30 DIAGNOSIS — R10.84 ABDOMINAL PAIN, GENERALIZED: ICD-10-CM

## 2023-08-30 DIAGNOSIS — K50.80 CROHN'S COLITIS: ICD-10-CM

## 2023-08-30 DIAGNOSIS — K50.90 CROHN'S DISEASE: ICD-10-CM

## 2023-08-30 DIAGNOSIS — K22.2 ESOPHAGEAL STENOSIS: ICD-10-CM

## 2023-08-30 DIAGNOSIS — B37.81 ESOPHAGEAL CANDIDIASIS: ICD-10-CM

## 2023-08-30 DIAGNOSIS — R94.5 ELEVATED LFTS: ICD-10-CM

## 2023-08-30 PROCEDURE — 99214 OFFICE O/P EST MOD 30 MIN: CPT | Performed by: INTERNAL MEDICINE

## 2023-08-30 RX ORDER — USTEKINUMAB 90 MG/ML
INJECT 90 MG INJECTION, SOLUTION SUBCUTANEOUS
Qty: 1 EACH | Refills: 9 | Status: ACTIVE | COMMUNITY

## 2023-08-30 NOTE — HPI-TODAY'S VISIT:
22 yo male with CD doing well on Stelara needs re-authorization. Needs urgent re-auth as last dose was 23, 12 weeks ago. Stelara is controlling Perry's Crohn's very well, as he has no syx, no pain diarrhea or bleeding. AGWS. EIM- none.  No other active nedical problems. Was on Vyvance and has tapered off it and is functioning well.  2022 colonoscopy showed no bx abn of ileum or colon. EGD showed candida of esophagus and an atypical stenosis that may be congenital change. Repeat EGD may be wise for verification of Candida resolution. He had no syx of Candida before. =============================== Dec 2022  23 yo male with CD for 3 day f/u of path and urgent request for Stelara re-inductuion.  Stelara level was likely falsely low becasue he was getting his Stelara and injfecting evering every 9-10 weeks.  Likely will have good level if getting Stelara every 8 weeks and wont need iv rein Also labs all nl and bx all nl in colon and ilum False pos visual CD of ileum possbile.  re: Esoph abnormality,  When he was a baby he had difficulty breathing. He had some type of scan. An artery is wrapped around the esophagus. 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 They were told it should not cause any abnormality. Nothing has been said.  Perry goes back to [place] around new years. Should I GET   CT or MRI to eval esophagus  The important thing is to clarify the imaging.  During his Crohns diagnosis and he saw a cardiologist, told again that is artery is fine. Cardiologist was fine.  Question is what is the abn and is there any clinical implication beyond the narrowing seen.  Maybe both a Barium swallow and a CT scan should be done.      ======================= 23 yo male with CD for TH f/u.  Dad has covid, and so they could not come from [place].  Perry has not had bivalent booster.  Wanted to review path from  procedure but not back yet.  Doing the same as 1 month ago.  Did not have any problemes.  Taking Stelara every 8 weeks.  No heartburn or esophageal complaints.  His trough uste level was 1.3, 6 months ago. He is going back to [place] where he will be staying. 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 Living the life. Back home for check up.  Feeling fine. Only time he has a problem is just before the Stelara Proactive monitoring rather than reactive monitroing. Awaiting Stelara and other labs.  Dx with CD 2020, dr. Bran. Rx only with Stelara started 2020. Before Stelara, had appy x2. 22------ Let him know i reviewed his stool tests which were nl, and so, if he had been taking stelara q 10, just move up to q 8 and that will be fine ============================= 6/3/22 and prior TH on 20  23 yo male for telehealth visit. 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  Crohn's disease has been stable. No recurrence of syx. I started him on Stelara. Hadnot been on other biologics.  Currently no pain diarrhea or bleeding.  He went and had labs a couple weeks ago and 22 labs showed  ESR 24/15 CRP-107!!!~ nl cbc fe5% Vit D -100   Day of labs was in [place] and had bad allergies and that might have causedd the CRP to be so high and so will repeat.   ========================== 2020  21 yo male with Crohn's disease dx this year.  Feeling the best he has in the last 6 months.  CD was ongoing in 2019 and was not diagnosed.  Primarily  the abd pain.  Had some diarrhea, only food triggered.  Had diarrhea up to 5x a day. No bleeding. Abd pain was moderate.  Abd pain is gone. Diarrhea is resolved. Stooling nl.  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  Meds: Stelara 90 mg q 8 weeks Vit D 10,000 u  Fluoxetine 20 mg a day ---- it has helped the anxiety.  He has more energy.  Follow up in 2 months.  everything is good.   Had a borderline b12, elevated liver test, and high calpro and lact values. will want to f/u on these and have him do a f2f in 3 months and a colonoscopy in early 20  This is a scheduled appointment for this patient, a 20 year old male, after a previous visit approximately Crohn's disease.     21 yo male seen 3 weeks ago presents for telehealth audio visit as video not connected.  Syx are the same.  Abdominal pain is the same as a few weeks ago. Pain is 1 hour or less. Pain is 10 minutes or less.  No constipation or diarrhea.  No bleeding.  Tapering prednisone and is down to 10 mg or 1 pill a day.  On flagyl 500 bid.  He does not know why he does not feel well.  He does not feel the Crohn's is a problem right now.  Physically with the Crohn's is ok.  Mentally, not good. Been on 1 pill a day and had an 8 week course. Got the Stelara 5 days ago.  Stop the prednisone. Taking Vit D. B12 of 299. labs generally good ----- lft 111. No alcohol  His mind is racing. He can't get his mind to settle. This was an issue from before.    ===== 3/23/20 21 yo male referred by Emil Bran MD for Crohn's disease care and for evaluation and treatment.  Syx started summer 2019 and they have been intermittent.  He thought syx were muscular.  Had pain for 2 weeks but severe for 2 days before presenting to the ER on 19.  Dr. Abreu-surgeon - saw him in the ER, got a CT scan, and did a lap appendectomy.  Went home the same.    Was on cipro for 10 days and might have been better.  Pain was better over the next few weeks.  Went back to the ER because of abdominal pain and was reassured.  One week later, went back to the ER due to abdominal pain,did a CT scan, and was told there was inflammation and scar tissue.  Pain was moderate, mostly with activity.  Over the next 2 months, syx were intermittent, saw family medicine, saw the surgeon.  , Dr. Agustin, [facility] surgery, ordered another CT due to pain, and was admitted  and had stump appendectomy and graunulomas were seen on that bx.  After second surgery was on cipro and flagyl and just finished them both and tolerated them fine and has been feeling better than in past.  10 days of both after second surgery.  Colonoscopy on  and this showed ileocecal ulceration and bx c/w Crohn's disease. Granulomas not seen.  Granulation tissue and crypt abscesses.  Was on 40 mg prednisone a day and this is second week of 30 mg a day.  8 week course/taper.  Currently on prednisone 30 mg a day.  Has some low grade intermittent abdominal pain.  Ate junk food.  Syx  2 stools a day No pain for days. Energy       Review of Systems  ConstitutionalDenies : body aches, fever, weight gain, weight loss  CardiovascularDenies : chest pain, heart racing/skipping, high blood pressure  RespiratoryDenies : chronic cough, shortness of breath, wheezing or asthma symptoms  GastrointestinalDenies : Abdominal Pain/discomfort, Anal/Rectal Pain or Itching, Anal Spasm, Black Stool, Bloating/belching/gaseouness, Change of Bowel Habit, Constipation, Diarrhea/Loose Stool, Difficulty in Swallowing, Heartburn/esophageal reflux, Hemorrhoids, Indigestion, Mucus in Stool, Nausea/vomiting, Rectal Bleeding, Unintentional Weight Loss    Vitals  Date Time BP Position Site L\R Cuff Size HR RR TEMP (F) WT  HT  BMI kg/m2 BSA m2 O2 Sat HC     2020 09:51 AM         161lbs 0oz 6'   21.84 1.93          Assessment  Crohn's Disease     555.2  Elevated LFTs     790.6/R94.5   Problems Reconciled  Plan  OrdersPatient not identified as an unhealthy alcohol user when screene () - - 2020  Influenza immunization administered or previously received () - - 2020  BMI screening above normal () - - 2020  Current tobacco non-user (CAD, cap, COPD, PV) (dm) (IBD) (1036F, ) - - 2020  Colorectal cancer screening results documented and reviewed (PV) (3017F) - - 2020  Documentation of current medications. () - - 2020  MedicationsMedications have been Reconciled  Transition of Care or Provider Policy  InstructionsPatient seen today via telehealth by agreement and consent of patient in light of current COVID-19 pandemic. I used video conferencing during the visit. The patient encounter is appropriate and reasonable under the circumstances given the patient's particular presentation at this time. The patient has been advised of the followin) the potential risks and limitations of this mode of treatment (including but not limited to the absence of in-person examination); 2) the right to refuse telehealth services at any point without affecting the right to future care; 3) the right to receive in-person services, included immediately after this consultation if an urgent need arises; 4) information, including identifiable images or information from this telehealth consult, will only be shared in accordance with HIPPA regulations. Any and all of the patient's and/or patient's family member's questions on this issue have been answered. The patient has verbally consented to be treated via telehealth services. The patient has also been advised to contact this office for worsening conditions or problems, and seek emergency medical treatment and/or call 911 if the patient deems either necessary.  Telephone E/M 16-20 minutes ([id number] NP/EP)  I have documented a list of current medications and reviewed it with the patient.  I encouraged the patient to diet and exercise.  DispositionReturn To Clinic in 1 Month    26445 visit. 16 minutes.  telehealth in a month  d/c flagyl d/c prednisone       ----------22  et him know i reviewed his stool tests which were nl, and so, if he had been taking stelara q 10, just move up to q 8 and that will be fine  --------------------------- Electronically Signed by: Norris Panchal MD -Author on 2020 02:53:19 PM

## 2023-09-05 ENCOUNTER — TELEPHONE ENCOUNTER (OUTPATIENT)
Dept: URBAN - METROPOLITAN AREA CLINIC 98 | Facility: CLINIC | Age: 23
End: 2023-09-05

## 2023-09-05 RX ORDER — USTEKINUMAB 90 MG/ML
INJECT 90 MG INJECTION, SOLUTION SUBCUTANEOUS
Qty: 1 EACH | Refills: 9
End: 2025-03-18

## 2023-09-10 ENCOUNTER — ERX REFILL RESPONSE (OUTPATIENT)
Dept: URBAN - METROPOLITAN AREA CLINIC 98 | Facility: CLINIC | Age: 23
End: 2023-09-10

## 2023-09-10 RX ORDER — USTEKINUMAB 90 MG/ML
INJECT 90 MG INJECTION, SOLUTION SUBCUTANEOUS
Qty: 1 EACH | Refills: 9 | OUTPATIENT

## 2023-11-21 ENCOUNTER — CLAIMS CREATED FROM THE CLAIM WINDOW (OUTPATIENT)
Dept: URBAN - METROPOLITAN AREA CLINIC 4 | Facility: CLINIC | Age: 23
End: 2023-11-21
Payer: COMMERCIAL

## 2023-11-21 ENCOUNTER — OUT OF OFFICE VISIT (OUTPATIENT)
Dept: URBAN - METROPOLITAN AREA SURGERY CENTER 18 | Facility: SURGERY CENTER | Age: 23
End: 2023-11-21
Payer: COMMERCIAL

## 2023-11-21 DIAGNOSIS — B37.81 CANDIDAL ESOPHAGITIS: ICD-10-CM

## 2023-11-21 DIAGNOSIS — Z86.19: ICD-10-CM

## 2023-11-21 DIAGNOSIS — K21.9 ACID REFLUX: ICD-10-CM

## 2023-11-21 DIAGNOSIS — K21.9 GASTRO-ESOPHAGEAL REFLUX DISEASE WITHOUT ESOPHAGITIS: ICD-10-CM

## 2023-11-21 PROCEDURE — 43239 EGD BIOPSY SINGLE/MULTIPLE: CPT | Performed by: INTERNAL MEDICINE

## 2023-11-21 PROCEDURE — G8907 PT DOC NO EVENTS ON DISCHARG: HCPCS | Performed by: INTERNAL MEDICINE

## 2023-11-21 PROCEDURE — 00731 ANES UPR GI NDSC PX NOS: CPT | Performed by: NURSE ANESTHETIST, CERTIFIED REGISTERED

## 2023-11-21 PROCEDURE — 88312 SPECIAL STAINS GROUP 1: CPT | Performed by: PATHOLOGY

## 2023-11-21 PROCEDURE — 88305 TISSUE EXAM BY PATHOLOGIST: CPT | Performed by: PATHOLOGY

## 2023-11-21 RX ORDER — USTEKINUMAB 90 MG/ML
INJECT 90 MG INJECTION, SOLUTION SUBCUTANEOUS
Qty: 1 EACH | Refills: 9 | Status: ACTIVE | COMMUNITY

## 2024-01-05 ENCOUNTER — TELEPHONE ENCOUNTER (OUTPATIENT)
Dept: URBAN - METROPOLITAN AREA CLINIC 98 | Facility: CLINIC | Age: 24
End: 2024-01-05

## 2024-01-10 ENCOUNTER — TELEPHONE ENCOUNTER (OUTPATIENT)
Dept: URBAN - METROPOLITAN AREA CLINIC 98 | Facility: CLINIC | Age: 24
End: 2024-01-10

## 2024-01-10 RX ORDER — USTEKINUMAB 90 MG/ML
INJECT 90 MG INJECTION, SOLUTION SUBCUTANEOUS
Qty: 1 EACH | Refills: 9
End: 2025-07-23

## 2024-10-14 ENCOUNTER — TELEPHONE ENCOUNTER (OUTPATIENT)
Dept: URBAN - METROPOLITAN AREA CLINIC 98 | Facility: CLINIC | Age: 24
End: 2024-10-14

## 2024-10-14 RX ORDER — USTEKINUMAB 90 MG/ML
INJECT 90 MG INJECTION, SOLUTION SUBCUTANEOUS
Qty: 1 EACH | Refills: 9
End: 2026-04-27

## 2024-10-15 ENCOUNTER — TELEPHONE ENCOUNTER (OUTPATIENT)
Dept: URBAN - METROPOLITAN AREA CLINIC 98 | Facility: CLINIC | Age: 24
End: 2024-10-15

## 2024-10-15 RX ORDER — USTEKINUMAB 90 MG/ML
INJECT 90 MG INJECTION, SOLUTION SUBCUTANEOUS
Qty: 1 EACH | Refills: 9
End: 2026-04-28

## 2025-02-05 ENCOUNTER — TELEPHONE ENCOUNTER (OUTPATIENT)
Dept: URBAN - METROPOLITAN AREA CLINIC 98 | Facility: CLINIC | Age: 25
End: 2025-02-05

## 2025-02-05 RX ORDER — USTEKINUMAB 90 MG/ML
INJECT 90 MG INJECTION, SOLUTION SUBCUTANEOUS
Qty: 1 EACH | Refills: 9
End: 2026-08-19

## 2025-02-10 ENCOUNTER — TELEPHONE ENCOUNTER (OUTPATIENT)
Dept: URBAN - METROPOLITAN AREA CLINIC 98 | Facility: CLINIC | Age: 25
End: 2025-02-10

## 2025-02-10 RX ORDER — USTEKINUMAB 90 MG/ML
INJECT 90 MG INJECTION, SOLUTION SUBCUTANEOUS
Qty: 1 EACH | Refills: 9
End: 2026-08-24